# Patient Record
Sex: FEMALE | Race: WHITE
[De-identification: names, ages, dates, MRNs, and addresses within clinical notes are randomized per-mention and may not be internally consistent; named-entity substitution may affect disease eponyms.]

---

## 2017-04-02 ENCOUNTER — HOSPITAL ENCOUNTER (EMERGENCY)
Dept: HOSPITAL 12 - ER | Age: 37
Discharge: HOME | End: 2017-04-02
Payer: COMMERCIAL

## 2017-04-02 VITALS — BODY MASS INDEX: 21.83 KG/M2 | WEIGHT: 131 LBS | HEIGHT: 65 IN

## 2017-04-02 VITALS — SYSTOLIC BLOOD PRESSURE: 123 MMHG | DIASTOLIC BLOOD PRESSURE: 77 MMHG

## 2017-04-02 DIAGNOSIS — F41.9: ICD-10-CM

## 2017-04-02 DIAGNOSIS — S43.402A: ICD-10-CM
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
## 2017-04-02 DIAGNOSIS — S80.02XA: ICD-10-CM

## 2017-04-02 DIAGNOSIS — F12.10: ICD-10-CM

## 2017-04-02 DIAGNOSIS — S06.0X0A: Primary | ICD-10-CM

## 2017-04-02 DIAGNOSIS — F31.9: ICD-10-CM
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
## 2017-04-02 DIAGNOSIS — F17.200: ICD-10-CM

## 2017-04-02 DIAGNOSIS — S70.02XA: ICD-10-CM

## 2017-04-02 PROCEDURE — 99284 EMERGENCY DEPT VISIT MOD MDM: CPT

## 2017-04-02 PROCEDURE — 96372 THER/PROPH/DIAG INJ SC/IM: CPT

## 2017-04-02 PROCEDURE — 70450 CT HEAD/BRAIN W/O DYE: CPT

## 2017-04-02 PROCEDURE — 72125 CT NECK SPINE W/O DYE: CPT

## 2017-04-02 PROCEDURE — 73562 X-RAY EXAM OF KNEE 3: CPT

## 2017-04-02 PROCEDURE — 71101 X-RAY EXAM UNILAT RIBS/CHEST: CPT

## 2017-04-02 PROCEDURE — A4663 DIALYSIS BLOOD PRESSURE CUFF: HCPCS

## 2017-04-02 PROCEDURE — 73030 X-RAY EXAM OF SHOULDER: CPT

## 2017-04-02 PROCEDURE — 73502 X-RAY EXAM HIP UNI 2-3 VIEWS: CPT

## 2023-02-27 ENCOUNTER — HOSPITAL ENCOUNTER (EMERGENCY)
Dept: HOSPITAL 54 - ER | Age: 43
Discharge: HOME | End: 2023-02-27
Payer: COMMERCIAL

## 2023-02-27 VITALS — DIASTOLIC BLOOD PRESSURE: 68 MMHG | SYSTOLIC BLOOD PRESSURE: 111 MMHG

## 2023-02-27 VITALS — BODY MASS INDEX: 18.96 KG/M2 | HEIGHT: 66 IN | WEIGHT: 118 LBS

## 2023-02-27 DIAGNOSIS — Z91.018: ICD-10-CM

## 2023-02-27 DIAGNOSIS — F41.9: ICD-10-CM
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
## 2023-02-27 DIAGNOSIS — T50.901A: Primary | ICD-10-CM

## 2023-02-27 DIAGNOSIS — F32.A: ICD-10-CM

## 2023-02-27 LAB
ALBUMIN SERPL BCP-MCNC: 3.3 G/DL (ref 3.4–5)
ALP SERPL-CCNC: 57 U/L (ref 46–116)
ALT SERPL W P-5'-P-CCNC: 13 U/L (ref 12–78)
APAP SERPL-MCNC: 0 UG/ML (ref 10–30)
AST SERPL W P-5'-P-CCNC: 19 U/L (ref 15–37)
BASOPHILS # BLD AUTO: 0 K/UL (ref 0–0.2)
BASOPHILS NFR BLD AUTO: 0.6 % (ref 0–2)
BILIRUB DIRECT SERPL-MCNC: 0.1 MG/DL (ref 0–0.2)
BILIRUB SERPL-MCNC: 0.5 MG/DL (ref 0.2–1)
BUN SERPL-MCNC: 8 MG/DL (ref 7–18)
CALCIUM SERPL-MCNC: 8.9 MG/DL (ref 8.5–10.1)
CHLORIDE SERPL-SCNC: 109 MMOL/L (ref 98–107)
CO2 SERPL-SCNC: 26 MMOL/L (ref 21–32)
CREAT SERPL-MCNC: 0.8 MG/DL (ref 0.6–1.3)
EOSINOPHIL NFR BLD AUTO: 1.7 % (ref 0–6)
ETHANOL SERPL-MCNC: < 3 MG/DL (ref 0–0)
GLUCOSE SERPL-MCNC: 80 MG/DL (ref 74–106)
HCT VFR BLD AUTO: 39 % (ref 33–45)
HGB BLD-MCNC: 12.8 G/DL (ref 11.5–14.8)
LYMPHOCYTES NFR BLD AUTO: 1.9 K/UL (ref 0.8–4.8)
LYMPHOCYTES NFR BLD AUTO: 34.9 % (ref 20–44)
MCHC RBC AUTO-ENTMCNC: 33 G/DL (ref 31–36)
MCV RBC AUTO: 97 FL (ref 82–100)
MONOCYTES NFR BLD AUTO: 0.6 K/UL (ref 0.1–1.3)
MONOCYTES NFR BLD AUTO: 10.8 % (ref 2–12)
NEUTROPHILS # BLD AUTO: 2.8 K/UL (ref 1.8–8.9)
NEUTROPHILS NFR BLD AUTO: 52 % (ref 43–81)
PLATELET # BLD AUTO: 216 K/UL (ref 150–450)
POTASSIUM SERPL-SCNC: 3.7 MMOL/L (ref 3.5–5.1)
PROT SERPL-MCNC: 6.5 G/DL (ref 6.4–8.2)
RBC # BLD AUTO: 3.98 MIL/UL (ref 4–5.2)
SODIUM SERPL-SCNC: 139 MMOL/L (ref 136–145)
WBC NRBC COR # BLD AUTO: 5.5 K/UL (ref 4.3–11)

## 2023-02-27 PROCEDURE — G0480 DRUG TEST DEF 1-7 CLASSES: HCPCS
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
## 2023-02-27 NOTE — NUR
-------------------------------------------------------------------------------

          *** Note undone in [facility] - 02/27/23 at 2307 by JUANY ***          

-------------------------------------------------------------------------------

Patient discharged to home in stable condition. Written and verbal after care 
instructions given. Patient verbalizes understanding of instruction.

## 2023-06-21 ENCOUNTER — HOSPITAL ENCOUNTER (EMERGENCY)
Dept: HOSPITAL 54 - ER | Age: 43
Discharge: PSYCHIATRIC OR SUBSTANCE USE FACILITY | End: 2023-06-21
Payer: COMMERCIAL

## 2023-06-21 VITALS — HEIGHT: 66 IN | BODY MASS INDEX: 19.29 KG/M2 | WEIGHT: 120 LBS

## 2023-06-21 VITALS — SYSTOLIC BLOOD PRESSURE: 103 MMHG | TEMPERATURE: 98.2 F | DIASTOLIC BLOOD PRESSURE: 62 MMHG

## 2023-06-21 DIAGNOSIS — E87.6: ICD-10-CM

## 2023-06-21 DIAGNOSIS — Z20.822: ICD-10-CM

## 2023-06-21 DIAGNOSIS — F41.9: ICD-10-CM

## 2023-06-21 DIAGNOSIS — T42.4X1A: Primary | ICD-10-CM
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
## 2023-06-21 DIAGNOSIS — F32.A: ICD-10-CM

## 2023-06-21 DIAGNOSIS — F19.10: ICD-10-CM

## 2023-06-21 LAB
ALBUMIN SERPL BCP-MCNC: 3.6 G/DL (ref 3.4–5)
ALP SERPL-CCNC: 72 U/L (ref 46–116)
ALT SERPL W P-5'-P-CCNC: 19 U/L (ref 12–78)
AST SERPL W P-5'-P-CCNC: 24 U/L (ref 15–37)
BASOPHILS # BLD AUTO: 0 K/UL (ref 0–0.2)
BASOPHILS NFR BLD AUTO: 0.8 % (ref 0–2)
BILIRUB DIRECT SERPL-MCNC: 0.1 MG/DL (ref 0–0.2)
BILIRUB SERPL-MCNC: 0.3 MG/DL (ref 0.2–1)
BILIRUB UR QL STRIP: NEGATIVE
BUN SERPL-MCNC: 9 MG/DL (ref 7–18)
CALCIUM SERPL-MCNC: 8.4 MG/DL (ref 8.5–10.1)
CHLORIDE SERPL-SCNC: 104 MMOL/L (ref 98–107)
CO2 SERPL-SCNC: 28 MMOL/L (ref 21–32)
COLOR UR: YELLOW
CREAT SERPL-MCNC: 0.7 MG/DL (ref 0.6–1.3)
EOSINOPHIL NFR BLD AUTO: 2.8 % (ref 0–6)
ETHANOL SERPL-MCNC: < 3 MG/DL (ref 0–10)
GLUCOSE SERPL-MCNC: 90 MG/DL (ref 74–106)
GLUCOSE UR STRIP-MCNC: NEGATIVE MG/DL
HCT VFR BLD AUTO: 37 % (ref 33–45)
HGB BLD-MCNC: 12.4 G/DL (ref 11.5–14.8)
LEUKOCYTE ESTERASE UR QL STRIP: NEGATIVE
LYMPHOCYTES NFR BLD AUTO: 1.4 K/UL (ref 0.8–4.8)
LYMPHOCYTES NFR BLD AUTO: 24.1 % (ref 20–44)
MCHC RBC AUTO-ENTMCNC: 34 G/DL (ref 31–36)
MCV RBC AUTO: 96 FL (ref 82–100)
MONOCYTES NFR BLD AUTO: 0.6 K/UL (ref 0.1–1.3)
MONOCYTES NFR BLD AUTO: 9.7 % (ref 2–12)
NEUTROPHILS # BLD AUTO: 3.6 K/UL (ref 1.8–8.9)
NEUTROPHILS NFR BLD AUTO: 62.6 % (ref 43–81)
NITRITE UR QL STRIP: NEGATIVE
PH UR STRIP: 6 [PH] (ref 5–8)
PLATELET # BLD AUTO: 238 K/UL (ref 150–450)
POTASSIUM SERPL-SCNC: 2.8 MMOL/L (ref 3.5–5.1)
PROT SERPL-MCNC: 6.9 G/DL (ref 6.4–8.2)
PROT UR QL STRIP: (no result) MG/DL
RBC # BLD AUTO: 3.83 MIL/UL (ref 4–5.2)
RBC #/AREA URNS HPF: (no result) /HPF (ref 0–2)
SODIUM SERPL-SCNC: 137 MMOL/L (ref 136–145)
UROBILINOGEN UR STRIP-MCNC: 0.2 EU/DL
WBC #/AREA URNS HPF: (no result) /HPF (ref 0–3)
WBC NRBC COR # BLD AUTO: 5.8 K/UL (ref 4.3–11)

## 2023-06-21 PROCEDURE — 81001 URINALYSIS AUTO W/SCOPE: CPT

## 2023-06-21 PROCEDURE — C9803 HOPD COVID-19 SPEC COLLECT: HCPCS

## 2023-06-21 PROCEDURE — 87426 SARSCOV CORONAVIRUS AG IA: CPT

## 2023-06-21 PROCEDURE — 80143 DRUG ASSAY ACETAMINOPHEN: CPT

## 2023-06-21 PROCEDURE — 80320 DRUG SCREEN QUANTALCOHOLS: CPT

## 2023-06-21 PROCEDURE — 36415 COLL VENOUS BLD VENIPUNCTURE: CPT

## 2023-06-21 PROCEDURE — 99285 EMERGENCY DEPT VISIT HI MDM: CPT

## 2023-06-21 PROCEDURE — 80076 HEPATIC FUNCTION PANEL: CPT

## 2023-06-21 PROCEDURE — 82962 GLUCOSE BLOOD TEST: CPT

## 2023-06-21 PROCEDURE — 80307 DRUG TEST PRSMV CHEM ANLYZR: CPT

## 2023-06-21 PROCEDURE — 85025 COMPLETE CBC W/AUTO DIFF WBC: CPT

## 2023-06-21 PROCEDURE — 93005 ELECTROCARDIOGRAM TRACING: CPT

## 2023-06-21 PROCEDURE — 84703 CHORIONIC GONADOTROPIN ASSAY: CPT

## 2023-06-21 PROCEDURE — 80048 BASIC METABOLIC PNL TOTAL CA: CPT

## 2023-06-21 PROCEDURE — G0480 DRUG TEST DEF 1-7 CLASSES: HCPCS

## 2023-06-21 NOTE — NUR
Pt sleeping, not able to assess mental status. No signs of distress or 
discomfort, O2 sats 98% on room air. Pt on monitor 

Will continue to monitor throughout shift.

## 2023-06-21 NOTE — NUR
patient picked up by private ambulance in no distress accompanied by2 emt's in 
no distress. Going to [facility].

## 2023-06-21 NOTE — NUR
THE PATIENT SLEEPING IN ER BED #14. RESPONSIVE TO VERBAL STIMULI. RESPIRATION 
REGULAR AND UNLABORED. WILL CONTINUE TO MONITOR THE PATIENT.

## 2023-06-21 NOTE — NUR
PT ACCEPTED TO [facility] ROOM [address]A UNDER DR. JOHNSON CALL [phone], 
[phone] TO GIVE REPORT. ADDRESS [id number]. [place] [id number].